# Patient Record
Sex: FEMALE | Race: WHITE | NOT HISPANIC OR LATINO | URBAN - METROPOLITAN AREA
[De-identification: names, ages, dates, MRNs, and addresses within clinical notes are randomized per-mention and may not be internally consistent; named-entity substitution may affect disease eponyms.]

---

## 2021-09-05 ENCOUNTER — EMERGENCY (EMERGENCY)
Facility: HOSPITAL | Age: 36
LOS: 1 days | Discharge: HOME | End: 2021-09-05
Admitting: EMERGENCY MEDICINE
Payer: COMMERCIAL

## 2021-09-05 VITALS
OXYGEN SATURATION: 97 % | HEART RATE: 94 BPM | RESPIRATION RATE: 18 BRPM | SYSTOLIC BLOOD PRESSURE: 106 MMHG | TEMPERATURE: 98 F | DIASTOLIC BLOOD PRESSURE: 70 MMHG

## 2021-09-05 VITALS
HEART RATE: 126 BPM | SYSTOLIC BLOOD PRESSURE: 122 MMHG | RESPIRATION RATE: 18 BRPM | DIASTOLIC BLOOD PRESSURE: 90 MMHG | WEIGHT: 125 LBS | OXYGEN SATURATION: 98 % | HEIGHT: 66 IN | TEMPERATURE: 99 F

## 2021-09-05 LAB
ALBUMIN SERPL ELPH-MCNC: 4 G/DL — SIGNIFICANT CHANGE UP (ref 3.4–5)
ALP SERPL-CCNC: 88 U/L — SIGNIFICANT CHANGE UP (ref 40–120)
ALT FLD-CCNC: 49 U/L — HIGH (ref 12–42)
ANION GAP SERPL CALC-SCNC: 11 MMOL/L — SIGNIFICANT CHANGE UP (ref 9–16)
AST SERPL-CCNC: 36 U/L — SIGNIFICANT CHANGE UP (ref 15–37)
BASOPHILS # BLD AUTO: 0.06 K/UL — SIGNIFICANT CHANGE UP (ref 0–0.2)
BASOPHILS NFR BLD AUTO: 0.7 % — SIGNIFICANT CHANGE UP (ref 0–2)
BILIRUB SERPL-MCNC: 0.2 MG/DL — SIGNIFICANT CHANGE UP (ref 0.2–1.2)
BUN SERPL-MCNC: 11 MG/DL — SIGNIFICANT CHANGE UP (ref 7–23)
CALCIUM SERPL-MCNC: 8.3 MG/DL — LOW (ref 8.5–10.5)
CHLORIDE SERPL-SCNC: 109 MMOL/L — HIGH (ref 96–108)
CO2 SERPL-SCNC: 28 MMOL/L — SIGNIFICANT CHANGE UP (ref 22–31)
CREAT SERPL-MCNC: 0.75 MG/DL — SIGNIFICANT CHANGE UP (ref 0.5–1.3)
EOSINOPHIL # BLD AUTO: 0.35 K/UL — SIGNIFICANT CHANGE UP (ref 0–0.5)
EOSINOPHIL NFR BLD AUTO: 4.1 % — SIGNIFICANT CHANGE UP (ref 0–6)
ETHANOL SERPL-MCNC: 324 MG/DL — HIGH
GLUCOSE SERPL-MCNC: 95 MG/DL — SIGNIFICANT CHANGE UP (ref 70–99)
HCG SERPL-ACNC: 1 MIU/ML — SIGNIFICANT CHANGE UP
HCT VFR BLD CALC: 39.2 % — SIGNIFICANT CHANGE UP (ref 34.5–45)
HGB BLD-MCNC: 13.1 G/DL — SIGNIFICANT CHANGE UP (ref 11.5–15.5)
IMM GRANULOCYTES NFR BLD AUTO: 0.7 % — SIGNIFICANT CHANGE UP (ref 0–1.5)
LIDOCAIN IGE QN: 425 U/L — HIGH (ref 73–393)
LYMPHOCYTES # BLD AUTO: 2.8 K/UL — SIGNIFICANT CHANGE UP (ref 1–3.3)
LYMPHOCYTES # BLD AUTO: 32.7 % — SIGNIFICANT CHANGE UP (ref 13–44)
MCHC RBC-ENTMCNC: 29.7 PG — SIGNIFICANT CHANGE UP (ref 27–34)
MCHC RBC-ENTMCNC: 33.4 GM/DL — SIGNIFICANT CHANGE UP (ref 32–36)
MCV RBC AUTO: 88.9 FL — SIGNIFICANT CHANGE UP (ref 80–100)
MONOCYTES # BLD AUTO: 0.41 K/UL — SIGNIFICANT CHANGE UP (ref 0–0.9)
MONOCYTES NFR BLD AUTO: 4.8 % — SIGNIFICANT CHANGE UP (ref 2–14)
NEUTROPHILS # BLD AUTO: 4.89 K/UL — SIGNIFICANT CHANGE UP (ref 1.8–7.4)
NEUTROPHILS NFR BLD AUTO: 57 % — SIGNIFICANT CHANGE UP (ref 43–77)
NRBC # BLD: 0 /100 WBCS — SIGNIFICANT CHANGE UP (ref 0–0)
PLATELET # BLD AUTO: 221 K/UL — SIGNIFICANT CHANGE UP (ref 150–400)
POTASSIUM SERPL-MCNC: 3.8 MMOL/L — SIGNIFICANT CHANGE UP (ref 3.5–5.3)
POTASSIUM SERPL-SCNC: 3.8 MMOL/L — SIGNIFICANT CHANGE UP (ref 3.5–5.3)
PROT SERPL-MCNC: 7.5 G/DL — SIGNIFICANT CHANGE UP (ref 6.4–8.2)
RBC # BLD: 4.41 M/UL — SIGNIFICANT CHANGE UP (ref 3.8–5.2)
RBC # FLD: 13.4 % — SIGNIFICANT CHANGE UP (ref 10.3–14.5)
SODIUM SERPL-SCNC: 148 MMOL/L — HIGH (ref 132–145)
WBC # BLD: 8.57 K/UL — SIGNIFICANT CHANGE UP (ref 3.8–10.5)
WBC # FLD AUTO: 8.57 K/UL — SIGNIFICANT CHANGE UP (ref 3.8–10.5)

## 2021-09-05 PROCEDURE — 93010 ELECTROCARDIOGRAM REPORT: CPT

## 2021-09-05 PROCEDURE — 74177 CT ABD & PELVIS W/CONTRAST: CPT | Mod: 26

## 2021-09-05 PROCEDURE — 99285 EMERGENCY DEPT VISIT HI MDM: CPT | Mod: 25

## 2021-09-05 RX ORDER — SODIUM CHLORIDE 9 MG/ML
1000 INJECTION INTRAMUSCULAR; INTRAVENOUS; SUBCUTANEOUS ONCE
Refills: 0 | Status: COMPLETED | OUTPATIENT
Start: 2021-09-05 | End: 2021-09-05

## 2021-09-05 RX ORDER — ONDANSETRON 8 MG/1
4 TABLET, FILM COATED ORAL ONCE
Refills: 0 | Status: COMPLETED | OUTPATIENT
Start: 2021-09-05 | End: 2021-09-05

## 2021-09-05 RX ORDER — FAMOTIDINE 10 MG/ML
20 INJECTION INTRAVENOUS ONCE
Refills: 0 | Status: COMPLETED | OUTPATIENT
Start: 2021-09-05 | End: 2021-09-05

## 2021-09-05 RX ADMIN — ONDANSETRON 4 MILLIGRAM(S): 8 TABLET, FILM COATED ORAL at 01:37

## 2021-09-05 RX ADMIN — FAMOTIDINE 20 MILLIGRAM(S): 10 INJECTION INTRAVENOUS at 01:37

## 2021-09-05 RX ADMIN — SODIUM CHLORIDE 1000 MILLILITER(S): 9 INJECTION INTRAMUSCULAR; INTRAVENOUS; SUBCUTANEOUS at 03:42

## 2021-09-05 RX ADMIN — SODIUM CHLORIDE 1000 MILLILITER(S): 9 INJECTION INTRAMUSCULAR; INTRAVENOUS; SUBCUTANEOUS at 01:38

## 2021-09-05 RX ADMIN — Medication 30 MILLILITER(S): at 06:38

## 2021-09-05 NOTE — ED PROVIDER NOTE - NSFOLLOWUPINSTRUCTIONS_ED_ALL_ED_FT
Esophagitis    WHAT YOU NEED TO KNOW:    Esophagitis is inflammation or irritation of the lining of the esophagus.     Digestive Tract         DISCHARGE INSTRUCTIONS:    Call your local emergency number (911 in the [place]) for any of the following:   •You have any of the following signs of a heart attack: ?Squeezing, pressure, or pain in your chest      ?You may also have any of the following: ?Discomfort or pain in your back, neck, jaw, stomach, or arm      ?Shortness of breath      ?Nausea or vomiting      ?Lightheadedness or a sudden cold sweat            Seek care immediately if:   •You feel like you have food stuck in your throat and you cannot cough it out.          Call your doctor if:   •You have new or worsening symptoms, even after treatment.      •You have questions or concerns about your condition or care.      Medicines:   •Medicines may be given to fight an infection or to control stomach acid.      •Take your medicine as directed. Contact your healthcare provider if you think your medicine is not helping or if you have side effects. Tell him or her if you are allergic to any medicine. Keep a list of the medicines, vitamins, and herbs you take. Include the amounts, and when and why you take them. Bring the list or the pill bottles to follow-up visits. Carry your medicine list with you in case of an emergency.      Manage or prevent esophagitis:   •Do not smoke. Nicotine and other chemicals in cigarettes and cigars can irritate and damage your esophagus. Ask your healthcare provider for information if you currently smoke and need help to quit. E-cigarettes or smokeless tobacco still contain nicotine. Talk to your healthcare provider before you use these products.       •Do not drink alcohol. Alcohol can irritate your esophagus. Talk to your healthcare provider if you need help to stop drinking.      •Limit or do not eat foods that can lead to esophagitis. Foods such as oranges and salsa can irritate your esophagus. Caffeine and chocolate can cause acid reflux. High-fat and fried foods make your stomach digest food more slowly. This increases the amount of stomach acid your esophagus is exposed to. Eat small meals, and drink water with your meals. Soft foods such as yogurt and applesauce may help soothe your throat. Do not eat for at least 3 hours before you go to bed.      •Drink more liquid when you take pills. Drink a full glass of water when you take your pills. Ask your healthcare provider if you can take your pills at least an hour before you go to bed.      •Prevent acid reflux. Do not bend over unless it is necessary. Acid may back up into your esophagus when you bend over. If possible, keep the head of your bed elevated while you sleep. This will help keep acid from backing up. Manage stress. Stress can make your symptoms worse or cause stomach acid to back up.      •Keep batteries and similar objects out of the reach of children. Babies often put items in their mouths to explore them. Button batteries are easy to swallow and can cause serious damage. Keep the battery covers of electronic devices such as remote controls taped closed. Store all batteries and toxic materials where children cannot get to them. Use childproof locks to keep children away from dangerous materials.      Follow up with your doctor as directed: Your doctor may refer you to a stomach specialist, allergist, or dietitian. You may need ongoing tests or treatment. Write down your questions so you remember to ask them during your visits.

## 2021-09-05 NOTE — ED PROVIDER NOTE - CARE PROVIDER_API CALL
Scooby Hodges)  Gastroenterology; Internal Medicine  [address] [place] [id number]  Phone: [phone]  Fax: [phone]  Follow Up Time:     your PMD/GI doctor,   Phone: (   )    -  Fax: (   )    -  Follow Up Time:

## 2021-09-05 NOTE — ED PROVIDER NOTE - PATIENT PORTAL LINK FT
You can access the FollowMyHealth Patient Portal offered by [facility] by registering at the following website: [email or web address]. By joining [facility]’s FollowMyHealth portal, you will also be able to view your health information using other applications (apps) compatible with our system.

## 2021-09-05 NOTE — ED ADULT TRIAGE NOTE - CHIEF COMPLAINT QUOTE
Pt brought in by EMS from her friend's apartment with complaint of nausea and vomiting. Per pt's friend, they had cocaine tonight and alcohol. Pt actively vomiting at triage.

## 2021-09-05 NOTE — ED ADULT NURSE NOTE - OBJECTIVE STATEMENT
Pt awake and alert, aox4 w/ coherent speech. Pt admits to alcohol and cocaine use, c/o nausea. Pt denies pain or any other medical complaints at this time. No signs of injuries or traumas.

## 2021-09-05 NOTE — ED PROVIDER NOTE - PROVIDER TOKENS
PROVIDER:[TOKEN:[[id number]]],FREE:[LAST:[your PMD/GI doctor],PHONE:[(   )    -],FAX:[(   )    -]]

## 2021-09-05 NOTE — ED PROVIDER NOTE - CLINICAL SUMMARY MEDICAL DECISION MAKING FREE TEXT BOX
pt p/w N/V and AMS s/p heavy alcohol and cocaine use last night, no associated CP, labs with elevated BAL and lipase CT with signs suggestive of esophagitis, monitored in the ED with improved mental status, AFVSS, currently ambulatory with steady gait, tolerating PO without N/V, clear speech, without additional medical complaints noted.  Repeat exam and neuro/cranial nerve exams wnl.  No evidence of head/neck trauma. Pt feels much better and safe for discharge. Counseling provided. Medically stable for d/c. pt p/w N/V and AMS s/p heavy alcohol and cocaine use last night, no associated CP, labs with elevated BAL and lipase, CT with signs suggestive of esophagitis, monitored in the ED with improved mental status, AFVSS, currently ambulatory with steady gait, tolerating PO without N/V s/p GI cocktail and IVF, clear speech, without additional medical complaints noted.  Repeat exam and neuro/cranial nerve exams wnl.  No evidence of head/neck trauma. Pt feels much better and safe for discharge. Counseling provided. Medically stable for d/c.

## 2021-09-05 NOTE — ED PROVIDER NOTE - PHYSICAL EXAMINATION
Vital Signs - nursing notes reviewed and confirmed  Gen - WDWN F, non-toxic appearing, +active vomiting in triage, speaking in full sentences   Skin - warm, dry, intact  HEENT - AT/NC, PERRL, EOMI, no conjunctival injection, moist oral mucosa, TM intact b/l with good cone of lights, o/p clear with no erythema, edema, or exudate, uvula midline, airway patent, neck supple and NT, FROM, no JVD or carotid bruits b/l, no palpable nodes  CV - S1S2, rapid rate, regular rhythm   Resp - respiration non-labored, CTAB, symmetric bs b/l, no r/r/w  GI - NABS, soft, ND, NT, no rebound or guarding, no CVAT b/l   MS - w/w/p, no c/c/e, calves supple and NT, distal pulses symmetric b/l, brisk cap refills, +SILT  Neuro - AxOx3, no focal neuro deficits, negative nystagmus, ambulatory without gait disturbance

## 2021-09-05 NOTE — ED ADULT NURSE REASSESSMENT NOTE - NS ED NURSE REASSESS COMMENT FT1
Pt received from REBECCA Rossi. Pt resting comfortably in stretcher, pending CT. Breathing spontaneous and nonlabored, denies complaints at this time.

## 2021-09-05 NOTE — ED PROVIDER NOTE - OBJECTIVE STATEMENT
35 yo F with no known PMHx, occasional recreational drug use, BIBA for N/V and AMS.  Pt reports having some alcohol and cocaine tonight and started feeling nauseated and off her usual reaction to cocaine.  Reports 3 episodes of NBNB emesis en route to the ED.  Denies trauma, fall, HA, dizziness, bleeding, hematemesis, D/C, CP, SOB, palpitations, tremors, change in urinary/bowel function, and abdominal pain.

## 2021-09-05 NOTE — ED ADULT NURSE REASSESSMENT NOTE - NS ED NURSE REASSESS COMMENT FT1
Pt asleep in stretcher, no indicators of pain present. Breathing spontaneous and unlabored. Bed alarm on, safety maintained.

## 2021-09-05 NOTE — ED PROVIDER NOTE - CARE PLAN
1 Principal Discharge DX:	Esophagitis  Secondary Diagnosis:	Polysubstance dependence  Secondary Diagnosis:	Altered mental status   Principal Discharge DX:	Esophagitis  Secondary Diagnosis:	Altered mental status  Secondary Diagnosis:	Polysubstance dependence

## 2021-09-08 DIAGNOSIS — F19.20 OTHER PSYCHOACTIVE SUBSTANCE DEPENDENCE, UNCOMPLICATED: ICD-10-CM

## 2021-09-08 DIAGNOSIS — R41.82 ALTERED MENTAL STATUS, UNSPECIFIED: ICD-10-CM

## 2021-09-08 DIAGNOSIS — F17.200 NICOTINE DEPENDENCE, UNSPECIFIED, UNCOMPLICATED: ICD-10-CM

## 2021-09-08 DIAGNOSIS — K20.90 ESOPHAGITIS, UNSPECIFIED WITHOUT BLEEDING: ICD-10-CM
